# Patient Record
Sex: MALE | NOT HISPANIC OR LATINO | URBAN - METROPOLITAN AREA
[De-identification: names, ages, dates, MRNs, and addresses within clinical notes are randomized per-mention and may not be internally consistent; named-entity substitution may affect disease eponyms.]

---

## 2017-03-27 ENCOUNTER — OFFICE VISIT - HEALTHEAST (OUTPATIENT)
Dept: INTERNAL MEDICINE | Facility: CLINIC | Age: 22
End: 2017-03-27

## 2017-03-27 DIAGNOSIS — R22.1 NECK MASS: ICD-10-CM

## 2017-03-27 ASSESSMENT — MIFFLIN-ST. JEOR: SCORE: 1700.67

## 2017-10-31 ENCOUNTER — HOSPITAL ENCOUNTER (OUTPATIENT)
Dept: ULTRASOUND IMAGING | Facility: CLINIC | Age: 22
Discharge: HOME | End: 2017-10-31
Attending: INTERNAL MEDICINE

## 2017-10-31 DIAGNOSIS — R22.1 NECK MASS: ICD-10-CM

## 2017-11-22 ENCOUNTER — OFFICE VISIT - HEALTHEAST (OUTPATIENT)
Dept: OTOLARYNGOLOGY | Facility: CLINIC | Age: 22
End: 2017-11-22

## 2017-11-22 DIAGNOSIS — R22.1 NECK MASS: ICD-10-CM

## 2017-12-12 ENCOUNTER — COMMUNICATION - HEALTHEAST (OUTPATIENT)
Dept: INTERNAL MEDICINE | Facility: CLINIC | Age: 22
End: 2017-12-12

## 2017-12-19 ENCOUNTER — OFFICE VISIT - HEALTHEAST (OUTPATIENT)
Dept: INTERNAL MEDICINE | Facility: CLINIC | Age: 22
End: 2017-12-19

## 2017-12-19 DIAGNOSIS — Z01.818 PREOP EXAM FOR INTERNAL MEDICINE: ICD-10-CM

## 2017-12-19 DIAGNOSIS — R22.1 NECK MASS: ICD-10-CM

## 2017-12-19 ASSESSMENT — MIFFLIN-ST. JEOR: SCORE: 1741.49

## 2017-12-21 ASSESSMENT — MIFFLIN-ST. JEOR: SCORE: 1718.82

## 2017-12-22 ENCOUNTER — ANESTHESIA - HEALTHEAST (OUTPATIENT)
Dept: SURGERY | Facility: AMBULATORY SURGERY CENTER | Age: 22
End: 2017-12-22

## 2017-12-27 ENCOUNTER — SURGERY - HEALTHEAST (OUTPATIENT)
Dept: SURGERY | Facility: AMBULATORY SURGERY CENTER | Age: 22
End: 2017-12-27

## 2018-06-12 ENCOUNTER — AMBULATORY - HEALTHEAST (OUTPATIENT)
Dept: INTERNAL MEDICINE | Facility: CLINIC | Age: 23
End: 2018-06-12

## 2020-11-19 ENCOUNTER — VIRTUAL VISIT (OUTPATIENT)
Dept: FAMILY MEDICINE | Facility: OTHER | Age: 25
End: 2020-11-19
Payer: COMMERCIAL

## 2020-11-19 PROCEDURE — 99421 OL DIG E/M SVC 5-10 MIN: CPT | Performed by: PREVENTIVE MEDICINE

## 2020-11-19 NOTE — PROGRESS NOTES
"Date: 2020 12:19:44  Clinician: Georgi Kirby  Clinician NPI: [id number]  Patient: jacinto ritchie  Patient : 1995  Patient Address: [address] [place], [place] [address] [address]  Patient Phone: [phone]  Visit Protocol: URI  Patient Summary:  jacinto is a 25 year old ( : 1995 ) male who initiated a [facility] Visit for COVID-19 (Coronavirus) evaluation and screening. When asked the question \"Please sign me up to receive news, health information and promotions from [facility].\", jacinto responded \"No\".    jacinto states his symptoms started today.   His symptoms consist of rhinitis, myalgia, chills, a headache, nasal congestion, and anosmia. He is experiencing difficulty breathing due to nasal congestion but he is not short of breath. jacinto also feels feverish but was unable to measure his temperature.   Symptom details     Nasal secretions: The color of his mucus is clear.    Headache: He states the headache is mild (1-3 on a 10 point pain scale).      jacinto denies having vomiting, facial pain or pressure, malaise, sore throat, teeth pain, ageusia, diarrhea, ear pain, wheezing, cough, and nausea. He also denies taking antibiotic medication in the past month, having recent facial or sinus surgery in the past 60 days, and having a sinus infection within the past year.   Precipitating events  He has recently been exposed to someone with influenza. jacinto has not been in close contact with any high risk individuals.   Pertinent COVID-19 (Coronavirus) information  jacinto does not work or volunteer as healthcare worker or a . In the past 14 days, jacinto has not worked or volunteered at a healthcare facility or group living setting.   In the past 14 days, he also has not lived in a congregate living setting.   jacinto has not had a close contact with a laboratory-confirmed COVID-19 patient within 14 days of symptom onset.    Since 2019, jacinto has not been tested for COVID-19 and has had upper " respiratory infection (URI) or influenza-like illness.      Date(s) of previous URI or influenza-like illness (free-text): Last month     Symptoms jacinto experienced during previous URI or influenza-like illness as reported by the patient (free-text): Flu like symptoms        Pertinent medical history  jacinto does not need a return to work/school note.   Weight: 165 lbs   jacinto does not smoke or use smokeless tobacco.   Weight: 165 lbs    MEDICATIONS: No current medications, ALLERGIES: NKDA  Clinician Response:  Dear jacinto,   Your symptoms show that you may have coronavirus (COVID-19). This illness can cause fever, cough and trouble breathing. Many people get a mild case and get better on their own. Some people can get very sick.  What should I do?  We would like to test you for this virus.   1. Please call [phone] to schedule your visit. Explain that you were referred by [facility] to have a COVID-19 test. Be ready to share your [facility] visit ID number.  * If you need to schedule in [facility] please call [phone] or for [employer] [place] employees please call [phone].  * If you need to schedule in the [place] area please call [phone]. [place] employees call [phone].  The following will serve as your written order for this COVID Test, ordered by me, for the indication of suspected COVID [Z20.828]: The test will be ordered in [facility], our electronic health record, after you are scheduled. It will show as ordered and authorized by Lux Staton MD.  Order: COVID-19 (Coronavirus) PCR for SYMPTOMATIC testing from [facility].   2. When it's time for your COVID test:  Stay at least 6 feet away from others. (If someone will drive you to your test, stay in the backseat, as far away from the  as you can.)   Cover your mouth and nose with a mask, tissue or washcloth.  Go straight to the testing site. Don't make any stops on the way there or back.      3.Starting now: Stay home and away from others (self-isolate)  "until:   You've had no fever---and no medicine that reduces fever---for one full day (24 hours). And...   Your other symptoms have gotten better. For example, your cough or breathing has improved. And...   At least 10 days have passed since your symptoms started.       During this time, don't leave the house except for testing or medical care.   Stay in your own room, even for meals. Use your own bathroom if you can.   Stay away from others in your home. No hugging, kissing or shaking hands. No visitors.  Don't go to work, school or anywhere else.    Clean \"high touch\" surfaces often (doorknobs, counters, handles, etc.). Use a household cleaning spray or wipes. You'll find a full list of  on the EPA website: www.epa.gov/pesticide-registration/list-n-disinfectants-use-against-sars-cov-2.   Cover your mouth and nose with a mask, tissue or washcloth to avoid spreading germs.  Wash your hands and face often. Use soap and water.  Caregivers in these groups are at risk for severe illness due to COVID-19:  o People 65 years and older  o People who live in a nursing home or long-term care facility  o People with chronic disease (lung, heart, cancer, diabetes, kidney, liver, immunologic)  o People who have a weakened immune system, including those who:   Are in cancer treatment  Take medicine that weakens the immune system, such as corticosteroids  Had a bone marrow or organ transplant  Have an immune deficiency  Have poorly controlled HIV or AIDS  Are obese (body mass index of 40 or higher)  Smoke regularly   o Caregivers should wear gloves while washing dishes, handling laundry and cleaning bedrooms and bathrooms.  o Use caution when washing and drying laundry: Don't shake dirty laundry, and use the warmest water setting that you can.  o For more tips, go to www.cdc.gov/coronavirus/2019-ncov/downloads/10Things.pdf.    4.Sign up for [facility]. We know it's scary to hear that you might have COVID-19. We want to " track your symptoms to make sure you're okay over the next 2 weeks. Please look for an email from [place] [employer]---this is a free, online program that we'll use to keep in touch. To sign up, follow the link in the email. Learn more at [email or web address]  How can I take care of myself?   Get lots of rest. Drink extra fluids (unless a doctor has told you not to).   Take Tylenol (acetaminophen) for fever or pain. If you have liver or kidney problems, ask your family doctor if it's okay to take Tylenol.   Adults can take either:    650 mg (two 325 mg pills) every 4 to 6 hours, or...   1,000 mg (two 500 mg pills) every 8 hours as needed.    Note: Don't take more than 3,000 mg in one day. Acetaminophen is found in many medicines (both prescribed and over-the-counter medicines). Read all labels to be sure you don't take too much.   For children, check the Tylenol bottle for the right dose. The dose is based on the child's age or weight.    If you have other health problems (like cancer, heart failure, an organ transplant or severe kidney disease): Call your specialty clinic if you don't feel better in the next 2 days.       Know when to call 911. Emergency warning signs include:    Trouble breathing or shortness of breath Pain or pressure in the chest that doesn't go away Feeling confused like you haven't felt before, or not being able to wake up Bluish-colored lips or face.  Where can I get more information?   [facility] -- About COVID-19: [email or web address]/   CDC -- What to Do If You're Sick: www.cdc.gov/coronavirus/2019-ncov/about/steps-when-sick.html   CDC -- Ending Home Isolation: www.cdc.gov/coronavirus/2019-ncov/hcp/disposition-in-home-patients.html   CDC -- Caring for Someone: www.cdc.gov/coronavirus/2019-ncov/if-you-are-sick/care-for-someone.html   [facility] -- Interim Guidance for Hospital Discharge to Home: www.health.[facility].[place]./diseases/coronavirus/hcp/hospdischarge.pdf   [place]  [facility] clinical trials (COVID-19 research studies): clinicalaffairs.[facility]-clinical-trials    Below are the COVID-19 hotlines at the [facility] ([facility]). Interpreters are available.    For health questions: Call [phone] or 1-497.911.7306 (7 a.m. to 7 p.m.) For questions about schools and childcare: Call [phone] or 1-442.216.5749 (7 a.m. to 7 p.m.)    Diagnosis: Myalgia, unspecified site  Diagnosis ICD: M79.10

## 2020-11-21 ENCOUNTER — AMBULATORY - HEALTHEAST (OUTPATIENT)
Dept: FAMILY MEDICINE | Facility: CLINIC | Age: 25
End: 2020-11-21

## 2020-11-21 DIAGNOSIS — Z20.822 SUSPECTED COVID-19 VIRUS INFECTION: ICD-10-CM

## 2020-11-23 ENCOUNTER — COMMUNICATION - HEALTHEAST (OUTPATIENT)
Dept: SCHEDULING | Facility: CLINIC | Age: 25
End: 2020-11-23

## 2021-05-30 VITALS — BODY MASS INDEX: 23.85 KG/M2 | WEIGHT: 161 LBS | HEIGHT: 69 IN

## 2021-05-31 VITALS — HEIGHT: 69 IN | BODY MASS INDEX: 24.44 KG/M2 | WEIGHT: 165 LBS

## 2021-05-31 VITALS — HEIGHT: 69 IN | BODY MASS INDEX: 25.18 KG/M2 | WEIGHT: 170 LBS

## 2021-06-09 NOTE — PROGRESS NOTES
OFFICE VISIT NOTE         Assessment/Plan for  Johnathan Lee is a 22 y.o. male.  No Patient Care Coordination Note on file.       1.  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  Healthy individual       Plan:  Routine laboratory  Ultrasound neck mass  ENT referral  Patient Instructions     thyroglossal duct cyst        Diagnoses and all orders for this visit:    Neck mass  -     HM1(CBC and Differential)  -     Erythrocyte Sedimentation Rate  -     Urinalysis-UC if Indicated  -     Basic Metabolic Panel  -     Hepatic Profile  -     Thyroid [place]  -     Ambulatory referral to ENT  -     US Neck Limited; Future; Expected date: 3/27/17  -     HM1 (CBC with Diff)   This provider spent greater than 45 min. face-to-face time with the patient and/or his family.  More than half this time was spent in counseling and or coordination of care with other providers or agencies which were consistent with the nature of this patient's problems which are listed and described in the assessment and plan.    Kelton Villarreal MD  Internal medicine  [facility]  [phone]    This is an electronically verified report by Kelton Villarreal M.D.  (Note created with Dragon voice recognition and unintended spelling errors and word substitutions may occur)         Subjective:   Chief Complaint:  Establish Care and swollen esteban apple    3 plus months of lump over esteban apple. Normal voice. Occasional dysphagia. No pain.  Eating normally    No fever chills sweats.  Does not notice lumps elsewhere    Notes several years ago he had a submental neck mass treated with antibiotics with resolution      Healthy.  No other issues      PSFHx:: Past Medical History, Social History, and Family History reviewed and updated.  Medications and Allergies reviewed and reconciled.    Medications:  No current outpatient prescriptions on file.     No current facility-administered medications for this visit.   "    Allergies:No Known Allergies  PSFHx: Tobacco Status:  He  reports that he has never smoked. He does not have any smokeless tobacco history on file.    Review of Systems:     Extensive 12-point review of systems was performed. Please see the HPI for problem specific pertinent review of systems.     Patient does note he feels well    Otherwise, the following systems are noncontributory including constitutional, eyes, ears, nose and throat, cardiovascular, respiratory, gastrointestinal, genitourinary, musculoskeletal,neurological, skin and/or breast, endocrine, hematologic/lymph, allergic/immunologic and psychiatric.    .  .    Objective:    /72 (Patient Site: Left Arm, Patient Position: Sitting, Cuff Size: Adult Regular)  Pulse 72  Ht 5' 9\" (1.753 m)  Wt 161 lb (73 kg)  SpO2 99%  BMI 23.78 kg/m2  Weight:   Wt Readings from Last 3 Encounters:   03/27/17 161 lb (73 kg)     [unfilled]  161 lb (73 kg)    In general, no acute distress.  Psych-affect appropriate for situation    Skin-unremarkable. No rash or petichae  Lymph-no lymphadenopathy  Eyes-sclera white,midline  ENT-TMs normal  Nares normal  Throat normal tongue midline.  Tonsils are normal  Neck no adenopathy    Visible midline neck mass over the thyroid cartilage  Voice is normal  Moves cephalad with swallowing  egg shaped.  Smooth.  Cystic feeling.  Not nodular.    Chest clear  Cardiac regular no murmur  Abdomen no hepatosplenomegaly  Lymph nodes- negative neck axillary inguinal       "

## 2021-06-14 NOTE — PROGRESS NOTES
Preoperative Consultation   Johnathan Lee   22 y.o.  male    Date of visit: 12/19/2017  Physician: Derian Segura MD    This is a preoperative consultation requested by Dr. West in preparation for excision of neck mass on 12/27/2018 at [facility], fax [phone].       Assessment and Plan   Johnathan Lee was seen in preoperative consultation in preparation for excision of anterior neck mass..  This is a low risk surgery and the patient has no increased risk for major cardiac complications.  Please note patient has had no signs of infections.  No cardiopulmonary symptoms.  I believe we can proceed with the anesthesia and surgery as needed.    1. Preop exam for internal medicine    2. Neck mass         Patient Profile   Social History     Social History Narrative        Past Medical History   There is no problem list on file for this patient.      Past Surgical History  He has a past surgical history that includes none.     History of Present Illness   This 22 y.o. old male is in for preop exam.  He needs to have an anterior neck lesion resected.    Recent antiplatelet use: no  Personal or family history of bleeding or clotting disorders: no  Steroid use in the past year: no  Personal or family history of difficulty with anesthesia: no  Current cardiopulmonary symptoms: no  Last Menstrual Period: na    Review of Systems: A comprehensive review of systems was negative except as noted.     Medications and Allergies   No current outpatient prescriptions on file.     No current facility-administered medications for this visit.      No Known Allergies     Family and Social History   No family history on file.     Social History   Substance Use Topics     Smoking status: Never Smoker     Smokeless tobacco: Never Used      Comment: no oral toabcco     Alcohol use None      Comment: occasional        Physical Exam   General Appearance:   Healthy-appearing male in no distress.  Blood pressure 124/74.   "Pulse 66 and regular.    Ht 5' 9\" (1.753 m)  Wt 170 lb (77.1 kg)  BMI 25.1 kg/m2    EYES: Eyelids, conjunctiva, and sclera were normal. Pupils were normal. Cornea, iris, and lens were normal bilaterally.  HEAD, EARS, NOSE, MOUTH, AND THROAT: Head and face were normal. Hearing was normal to voice and the ears were normal to external exam. Nose appearance was normal and there was no discharge. Oropharynx was normal.  NECK: Neck appearance was normal. There is an anterior mid line  mobile neck mass about 2 cm in diameter.  No surrounding adenopathy.  RESPIRATORY: Breathing pattern was normal and the chest moved symmetrically.  Percussion/auscultatory percussion was normal.  Lung sounds were normal and there were no abnormal sounds.  CARDIOVASCULAR: Heart rate and rhythm were normal.  S1 and S2 were normal and there were no extra sounds or murmurs. Peripheral pulses in arms and legs were normal.  Jugular venous pressure was normal.  There was no peripheral edema.  GASTROINTESTINAL: The abdomen was normal in contour.  Bowel sounds were present.  Percussion detected no organ enlargement or tenderness.  Palpation detected no tenderness, mass, or enlarged organs.   MUSCULOSKELETAL: Skeletal configuration was normal and muscle mass was normal for age. Joint appearance was overall normal.  LYMPHATIC: There were no enlarged nodes.  SKIN/HAIR/NAILS: Skin color was normal.  There were no skin lesions.  Hair and nails were normal.  NEUROLOGIC: The patient was alert and oriented to person, place, time, and circumstance. Speech was normal. Cranial nerves were normal. Motor strength was normal for age. The patient was normally coordinated.  PSYCHIATRIC:  Mood and affect were normal and the patient had normal recent and remote memory. The patient's judgment and insight were normal.    ADDITIONAL VITAL SIGNS: Oxygen saturations 98%  CHEST WALL/BREASTS: Normal male.  RECTAL: na  GENITAL/URINARY: na     Additional Tests   Laboratory: " Hemoglobin pending    Radiology: na    Electrocardiogram: na    Total time spent with the patient today was 40 minutes of which > 50% was spent in counseling and coordination of care     Derian Segura MD  Internal Medicine  Contact me at [phone]

## 2021-06-14 NOTE — PROGRESS NOTES
HISTORY OF PRESENT ILLNESS  Swelling anterior neck going on for about 10 months.  It hasn't changed much in the time.  No pain.  No problems swallowing.  He first noticed one day.  Came on out of the blue.  He just happened to feel it and thought it was bigger than usual.      REVIEW OF SYSTEMS  Review of Systems: a 10-system review was performed. Pertinent positives are noted in the HPI and on a separate scanned document in the chart.    PMH, PSH, FH and SH has documented in the EHR.      EXAM    CONSTITUTIONAL  General Appearance:  Normal, well developed, well nourished, no obvious distress  Ability to Communicate:  communicates appropriately.    HEAD AND FACE  Appearance and Symmetry:  Normal, no scalp or facial scarring or suspicious lesions.  Paranasal sinuses tenderness:  Normal, Paranasal sinuses non tender    EARS  Clinical speech reception threshold:  Normal, able to hear normal speech.  Auricle:  Normal, Auricles without scars, lesions, masses.  External auditory canal:  Normal, External auditory canal normal.  Tympanic membrane:  Normal, Tympanic membranes normal without swelling or erythema.  Tympanic membrane mobility:  Normal, Normal tympanic membrane mobility.    NOSE (speculum or scope)  Architecture:  Normal, Grossly normal external nasal architecture with no masses or lesions.  Mucosa:  Normal mucosa, No polyps or masses.  Septum:  Normal, Septum non-obstructing.  Turbinates:  Normal, No turbinate abnormalities    ORAL CAVITY AND OROPHARYNX  Lips:  Normal.  Dental and gingiva:  Normal, No obvious dental or gingival disease.  Mucosa:  Normal, Moist mucous membranes.  Tongue:  Normal, Tongue mobile with no mucosal abnormalities  Hard and soft palate:  Normal, Hard and soft palate without cleft or mucosal lesions.  Oral pharynx:  Normal, Posterior pharynx without lesions or remarkable asymmetry.  Saliva:  Normal, Clear saliva.  Masses:  Normal, No palpable masses or pathologically enlarged lymph  nodes.    NECK  Masses/lymph nodes:  Fusiform, smooth and mobile 2cm mass overlying the thyroid cartilage.  Salivary glands:  Normal, Parotid and submandibular glands.  Trachea and larynx position:  Normal, Trachea and larynx midline.  Thyroid:  Normal, No thyroid abnormality.  Tenderness:  Normal, No cervical tenderness.  Suppleness:  Normal, Neck supple    NEUROLOGICAL  Speech pattern:  Normal, Proasaic    RESPIRATORY  Symmetry and Respiratory effort:  Normal, Symmetric chest movement and expansion with no increased intercostal retractions or use of accessory muscles.     ULTRASOUND NECK  Reviewed    IMPRESSION  Mass anterior neck overlying the thyroid cartilage.      RECOMMENDATION  I discussed findings.  This appears to be benign.  I discussed options including FNA vs excision.  He wants to think about his options.  He wondered if it was safe to observe.  I explained that while it appears to be benign the only way to understand the expected course would be to biopsy at a minimum.  He is going to contact me if he decides to proceed with biopsy or excision.    Sanju West MD

## 2021-06-15 NOTE — ANESTHESIA POSTPROCEDURE EVALUATION
Patient: Johnathan Lee  EXCISION, MASS, NECK - CENTRAL NECK   Anesthesia type: general    Patient location: Phase II Recovery  Last vitals:   Vitals:    12/27/17 1430   BP: 125/69   Pulse: 98   Resp: 14   Temp:    SpO2: 97%     Post vital signs: stable  Level of consciousness: awake and responds to simple questions  Post-anesthesia pain: pain controlled  Post-anesthesia nausea and vomiting: no  Pulmonary: unassisted, return to baseline  Cardiovascular: stable and blood pressure at baseline  Hydration: adequate  Anesthetic events: no    QCDR Measures:  ASA# 11 - Eli-op Cardiac Arrest: ASA11B - Patient did NOT experience unanticipated cardiac arrest  ASA# 12 - Eli-op Mortality Rate: ASA12B - Patient did NOT die  ASA# 13 - PACU Re-Intubation Rate: ASA13B - Patient did NOT require a new airway mgmt  ASA# 10 - Composite Anes Safety: ASA10A - No serious adverse event    Additional Notes:

## 2021-06-15 NOTE — ANESTHESIA PREPROCEDURE EVALUATION
Anesthesia Evaluation      Patient summary reviewed   History of anesthetic complications     Airway   Mallampati: I  Neck ROM: full   Pulmonary - negative ROS and normal exam                          Cardiovascular - negative ROS and normal exam  Exercise tolerance: > or = 4 METS  (-) murmur  Rhythm: regular  Rate: normal,    no murmur      Neuro/Psych - negative ROS     Endo/Other - negative ROS      GI/Hepatic/Renal - negative ROS           Dental - normal exam                        Anesthesia Plan  Planned anesthetic: general endotracheal    ASA 1   Induction: intravenous   Anesthetic plan and risks discussed with: patient    Post-op plan: routine recovery

## 2021-06-15 NOTE — ANESTHESIA CARE TRANSFER NOTE
Last vitals:   Vitals:    12/27/17 1420   BP: 128/71   Pulse: 89   Resp: 12   Temp: 36.8  C (98.3  F)   SpO2: 95%     Patient spontaneous RR, TV 400s, suctioned, following commands, extubated to facemask 10LPM, O2 sats 100%. VSS. Report to RN.    Patient's level of consciousness is drowsy  Spontaneous respirations: yes  Maintains airway independently: yes  Dentition unchanged: yes  Oropharynx: oropharynx clear of all foreign objects    QCDR Measures:  ASA# 20 - Surgical Safety Checklist: WHO surgical safety checklist completed prior to induction  PQRS# 430 - Adult PONV Prevention: 4558F - Pt received => 2 anti-emetic agents (different classes) preop & intraop  ASA# 8 - Peds PONV Prevention: NA - Not pediatric patient, not GA or 2 or more risk factors NOT present  PQRS# 424 - Eli-op Temp Management: 4559F - At least one body temp DOCUMENTED => 35.5C or 95.9F within required timeframe  PQRS# 426 - PACU Transfer Protocol: - Transfer of care checklist used  ASA# 14 - Acute Post-op Pain: ASA14B - Patient did NOT experience pain >= 7 out of 10

## 2021-06-26 ENCOUNTER — HEALTH MAINTENANCE LETTER (OUTPATIENT)
Age: 26
End: 2021-06-26

## 2021-10-16 ENCOUNTER — HEALTH MAINTENANCE LETTER (OUTPATIENT)
Age: 26
End: 2021-10-16

## 2022-07-17 ENCOUNTER — HEALTH MAINTENANCE LETTER (OUTPATIENT)
Age: 27
End: 2022-07-17

## 2022-09-25 ENCOUNTER — HEALTH MAINTENANCE LETTER (OUTPATIENT)
Age: 27
End: 2022-09-25

## 2023-08-05 ENCOUNTER — HEALTH MAINTENANCE LETTER (OUTPATIENT)
Age: 28
End: 2023-08-05

## 2023-12-12 ENCOUNTER — NURSE TRIAGE (OUTPATIENT)
Dept: NURSING | Facility: CLINIC | Age: 28
End: 2023-12-12
Payer: COMMERCIAL

## 2023-12-12 NOTE — TELEPHONE ENCOUNTER
Nurse Triage SBAR    Situation: Eye injury     Background: Significant Other, Graciela, calling. No consent on file. Playing pickle ball yesterday and he got his in the eye.     Assessment: Flashing and floaters in his eye. Spouse is not with the patient.     Recommendation: Informed Graciela that the patient needs to call FNA for appropriate triage. RN suggested calling the patient but she declined and stated she will have him call FNA back.     Evelyn Frias RN Nursing Advisor 12/12/2023 5:22 PM     Reason for Disposition   [1] Caller is not with the adult (patient) AND [2] probable NON-URGENT symptoms    Protocols used: Information Only Call - No Triage-A-